# Patient Record
Sex: MALE | Race: WHITE | NOT HISPANIC OR LATINO
[De-identification: names, ages, dates, MRNs, and addresses within clinical notes are randomized per-mention and may not be internally consistent; named-entity substitution may affect disease eponyms.]

---

## 2022-04-13 PROBLEM — Z00.00 ENCOUNTER FOR PREVENTIVE HEALTH EXAMINATION: Status: ACTIVE | Noted: 2022-04-13

## 2022-04-18 ENCOUNTER — FORM ENCOUNTER (OUTPATIENT)
Age: 62
End: 2022-04-18

## 2022-05-04 ENCOUNTER — APPOINTMENT (OUTPATIENT)
Dept: ORTHOPEDIC SURGERY | Facility: CLINIC | Age: 62
End: 2022-05-04
Payer: COMMERCIAL

## 2022-05-04 VITALS — HEIGHT: 70 IN | BODY MASS INDEX: 28.63 KG/M2 | WEIGHT: 200 LBS

## 2022-05-04 DIAGNOSIS — Z78.9 OTHER SPECIFIED HEALTH STATUS: ICD-10-CM

## 2022-05-04 PROCEDURE — 72040 X-RAY EXAM NECK SPINE 2-3 VW: CPT

## 2022-05-04 PROCEDURE — 99213 OFFICE O/P EST LOW 20 MIN: CPT

## 2022-05-05 ENCOUNTER — OUTPATIENT (OUTPATIENT)
Dept: OUTPATIENT SERVICES | Facility: HOSPITAL | Age: 62
LOS: 1 days | Discharge: HOME | End: 2022-05-05
Payer: COMMERCIAL

## 2022-05-05 VITALS
HEIGHT: 69 IN | WEIGHT: 209.88 LBS | RESPIRATION RATE: 17 BRPM | DIASTOLIC BLOOD PRESSURE: 86 MMHG | TEMPERATURE: 98 F | HEART RATE: 72 BPM | OXYGEN SATURATION: 97 % | SYSTOLIC BLOOD PRESSURE: 129 MMHG

## 2022-05-05 DIAGNOSIS — M16.11 UNILATERAL PRIMARY OSTEOARTHRITIS, RIGHT HIP: ICD-10-CM

## 2022-05-05 DIAGNOSIS — Z01.818 ENCOUNTER FOR OTHER PREPROCEDURAL EXAMINATION: ICD-10-CM

## 2022-05-05 DIAGNOSIS — I10 ESSENTIAL (PRIMARY) HYPERTENSION: ICD-10-CM

## 2022-05-05 DIAGNOSIS — E11.9 TYPE 2 DIABETES MELLITUS WITHOUT COMPLICATIONS: ICD-10-CM

## 2022-05-05 LAB
A1C WITH ESTIMATED AVERAGE GLUCOSE RESULT: 7.3 % — HIGH (ref 4–5.6)
ALBUMIN SERPL ELPH-MCNC: 4.2 G/DL — SIGNIFICANT CHANGE UP (ref 3.3–5)
ALP SERPL-CCNC: 74 U/L — SIGNIFICANT CHANGE UP (ref 40–120)
ALT FLD-CCNC: 39 U/L — SIGNIFICANT CHANGE UP (ref 12–78)
ANION GAP SERPL CALC-SCNC: 5 MMOL/L — SIGNIFICANT CHANGE UP (ref 5–17)
APTT BLD: 35.6 SEC — HIGH (ref 27.5–35.5)
AST SERPL-CCNC: 17 U/L — SIGNIFICANT CHANGE UP (ref 15–37)
BASOPHILS # BLD AUTO: 0.06 K/UL — SIGNIFICANT CHANGE UP (ref 0–0.2)
BASOPHILS NFR BLD AUTO: 0.9 % — SIGNIFICANT CHANGE UP (ref 0–2)
BILIRUB SERPL-MCNC: 0.8 MG/DL — SIGNIFICANT CHANGE UP (ref 0.2–1.2)
BLD GP AB SCN SERPL QL: SIGNIFICANT CHANGE UP
BUN SERPL-MCNC: 16 MG/DL — SIGNIFICANT CHANGE UP (ref 7–23)
CALCIUM SERPL-MCNC: 10.2 MG/DL — HIGH (ref 8.5–10.1)
CHLORIDE SERPL-SCNC: 106 MMOL/L — SIGNIFICANT CHANGE UP (ref 96–108)
CO2 SERPL-SCNC: 28 MMOL/L — SIGNIFICANT CHANGE UP (ref 22–31)
CREAT SERPL-MCNC: 0.83 MG/DL — SIGNIFICANT CHANGE UP (ref 0.5–1.3)
EGFR: 98 ML/MIN/1.73M2 — SIGNIFICANT CHANGE UP
EOSINOPHIL # BLD AUTO: 0.11 K/UL — SIGNIFICANT CHANGE UP (ref 0–0.5)
EOSINOPHIL NFR BLD AUTO: 1.7 % — SIGNIFICANT CHANGE UP (ref 0–6)
ESTIMATED AVERAGE GLUCOSE: 163 MG/DL — HIGH (ref 68–114)
GLUCOSE SERPL-MCNC: 168 MG/DL — HIGH (ref 70–99)
HCT VFR BLD CALC: 48.6 % — SIGNIFICANT CHANGE UP (ref 39–50)
HGB BLD-MCNC: 16.6 G/DL — SIGNIFICANT CHANGE UP (ref 13–17)
IMM GRANULOCYTES NFR BLD AUTO: 0.3 % — SIGNIFICANT CHANGE UP (ref 0–1.5)
INR BLD: 1.18 RATIO — HIGH (ref 0.88–1.16)
LYMPHOCYTES # BLD AUTO: 2.1 K/UL — SIGNIFICANT CHANGE UP (ref 1–3.3)
LYMPHOCYTES # BLD AUTO: 32.8 % — SIGNIFICANT CHANGE UP (ref 13–44)
MCHC RBC-ENTMCNC: 29.2 PG — SIGNIFICANT CHANGE UP (ref 27–34)
MCHC RBC-ENTMCNC: 34.2 G/DL — SIGNIFICANT CHANGE UP (ref 32–36)
MCV RBC AUTO: 85.6 FL — SIGNIFICANT CHANGE UP (ref 80–100)
MONOCYTES # BLD AUTO: 0.49 K/UL — SIGNIFICANT CHANGE UP (ref 0–0.9)
MONOCYTES NFR BLD AUTO: 7.7 % — SIGNIFICANT CHANGE UP (ref 2–14)
MRSA PCR RESULT.: SIGNIFICANT CHANGE UP
NEUTROPHILS # BLD AUTO: 3.62 K/UL — SIGNIFICANT CHANGE UP (ref 1.8–7.4)
NEUTROPHILS NFR BLD AUTO: 56.6 % — SIGNIFICANT CHANGE UP (ref 43–77)
NRBC # BLD: 0 /100 WBCS — SIGNIFICANT CHANGE UP (ref 0–0)
PLATELET # BLD AUTO: 208 K/UL — SIGNIFICANT CHANGE UP (ref 150–400)
POTASSIUM SERPL-MCNC: 4.4 MMOL/L — SIGNIFICANT CHANGE UP (ref 3.5–5.3)
POTASSIUM SERPL-SCNC: 4.4 MMOL/L — SIGNIFICANT CHANGE UP (ref 3.5–5.3)
PROT SERPL-MCNC: 7.5 GM/DL — SIGNIFICANT CHANGE UP (ref 6–8.3)
PROTHROM AB SERPL-ACNC: 14.2 SEC — HIGH (ref 10.5–13.4)
RBC # BLD: 5.68 M/UL — SIGNIFICANT CHANGE UP (ref 4.2–5.8)
RBC # FLD: 12.2 % — SIGNIFICANT CHANGE UP (ref 10.3–14.5)
S AUREUS DNA NOSE QL NAA+PROBE: SIGNIFICANT CHANGE UP
SODIUM SERPL-SCNC: 139 MMOL/L — SIGNIFICANT CHANGE UP (ref 135–145)
WBC # BLD: 6.4 K/UL — SIGNIFICANT CHANGE UP (ref 3.8–10.5)
WBC # FLD AUTO: 6.4 K/UL — SIGNIFICANT CHANGE UP (ref 3.8–10.5)

## 2022-05-05 PROCEDURE — 93010 ELECTROCARDIOGRAM REPORT: CPT

## 2022-05-05 NOTE — PHYSICAL THERAPY INITIAL EVALUATION ADULT - PERTINENT HX OF CURRENT PROBLEM, REHAB EVAL
R hip OA c chronic pain and  limiting functional mobility. Pt is scheduled for R hip elective total joint replacement, posterior approach,  on   by  Dr. Gutierrez.

## 2022-05-05 NOTE — OCCUPATIONAL THERAPY INITIAL EVALUATION ADULT - ADDITIONAL COMMENTS
Pt lives with family (Who can assist post op) in a private house with 1 small step to enter with no rails (Pt can fit a rolling walker on). Once inside, the pt has 5 steps with a L rail to a platform and then another 5 steps with no rails to reach the main floor where the bedroom and bathroom is. The pts bathroom has a walk in shower stall, fixed shower head, comfort height toilet and no grab bars. The pt reports that a 3/1 commode can fit over the toilet at home. The pt ambulates with no device and owns a straight cane. The pt daily pain is a 3-4/10 at rest and a 7/10 with movement. The pt manages the pain with rest with Hydrocodone 1x a day. The pt goes to outpatient PT 1-2x a week, no recent falls and has R knee buckling every now and then. The pt wears glasses all the time, R handed, drives and has no hearing impairments.

## 2022-05-05 NOTE — OCCUPATIONAL THERAPY INITIAL EVALUATION ADULT - PERTINENT HX OF CURRENT PROBLEM, REHAB EVAL
R hip OA which impacts pts ability to perform functional tasks/transfers and mobility. Pt is scheduled for R hip OA which impacts pts ability to perform functional tasks/transfers and mobility.

## 2022-05-05 NOTE — PHYSICAL THERAPY INITIAL EVALUATION ADULT - ADDITIONAL COMMENTS
There is one small platform step, wide enough to fit a rolling walker, at the entry of the house. There 5 steps, c L rail up and followed by another 5 steps, w/o rail,  to negotiate at home.  Pt has a walk in shower c fixed shower head,  no grab bar, and comfort height toilet seat in BR. 3-1 commode will fit in BR. Average pain level at rest is 3-4/10. Pain increases to 7/10 c weight bearing, prolonged standing, ambulation, and stairs negotiation. Pt takes hydrocodone at night for pain. Pt has no experience of adverse effects with pain medication. Pt is on out-pt physical therapy, once a week, at present.  There is no h/o fall but R knee babar once a while. Pt wears glasses for reading and distance and no need for hearing aid. Pt is R handed and drives.

## 2022-05-05 NOTE — H&P PST ADULT - PROBLEM SELECTOR PLAN 1
Right total hip replacement  labs - cbc,pt/ptt,bmp,t&s,nose cx,ekg  M/C required  cardiac clearance  preop 3 day hibiclens instruction reviewed and given .instructed on if  nose cx positive use mupuricin 5 days and checklist given  take routine meds DOS with sips of water. avoid NSAID and OTC supplements. verbalized understanding  information on proper nutrition , increase protein and better food choices provided in packet   ensure clear held 2/2 DM  Anesthesiologist to review PST labs, EKG, required clearances and optimization for surgery.

## 2022-05-05 NOTE — PHYSICAL THERAPY INITIAL EVALUATION ADULT - GENERAL OBSERVATIONS, REHAB EVAL
Patient was seen seated  in chair in PT/OT preoperative assessment room with no apparent distress. Height:  5'9"     ; weight : 205    lbs.

## 2022-05-05 NOTE — PHYSICAL THERAPY INITIAL EVALUATION ADULT - ASR EQUIP NEEDS DISCH PT EVAL
Pt owns a straight cane and it's in good working condition. Pt will purchase axillary crutches for safe stairs negotiation./3:1 commode/rolling walker (5 inch wheels)

## 2022-05-05 NOTE — PHYSICAL THERAPY INITIAL EVALUATION ADULT - LIVES WITH, PROFILE
in a private house. Family will be available to assist pt in post -op care upon discharge home./children/spouse

## 2022-05-05 NOTE — H&P PST ADULT - HISTORY OF PRESENT ILLNESS
62M pmh htn, dm c/o right hip pain 2/2 unilateral primary osteoarthritis here for PsT for scheduled Right total hip replacement  This patient denies any fever, cough, sob, flu like symptoms or travel outside of the [place] in the past 30 days

## 2022-05-05 NOTE — H&P PST ADULT - PRO ANTICIPATED DISCH DISP
Relevant Problems No relevant active problems Anesthetic History No history of anesthetic complications Pertinent negatives: No PONV Review of Systems / Medical History Patient summary reviewed, nursing notes reviewed and pertinent labs reviewed Pulmonary Pertinent negatives: No COPD, asthma and smoker Neuro/Psych Within defined limits Cardiovascular Within defined limits Pertinent negatives: No hypertension, past MI and CAD 
 
  
GI/Hepatic/Renal 
Within defined limits Pertinent negatives: No PUD (h/o), liver disease and renal disease Endo/Other Within defined limits Diabetes Other Findings Comments: etoh rare Physical Exam 
 
Airway Mallampati: II 
TM Distance: 4 - 6 cm Neck ROM: decreased range of motion Mouth opening: Normal 
 
 Cardiovascular Dental 
 
 
  
Pulmonary Abdominal 
 
 
 
 Other Findings Anesthetic Plan ASA: 2 Anesthesia type: general 
 
 
 
 
Induction: Intravenous Anesthetic plan and risks discussed with: Patient home

## 2022-05-05 NOTE — H&P PST ADULT - NSICDXPASTMEDICALHX_GEN_ALL_CORE_FT
PAST MEDICAL HISTORY:  DM (diabetes mellitus)     H/O hand surgery right    HTN (hypertension)     S/P cervical spinal fusion     S/P lumbar fusion

## 2022-05-06 LAB — VIT D25+D1,25 OH+D1,25 PNL SERPL-MCNC: 66.3 PG/ML — SIGNIFICANT CHANGE UP (ref 19.9–79.3)

## 2022-05-08 PROBLEM — Z78.9 NON-SMOKER: Status: ACTIVE | Noted: 2022-05-04

## 2022-05-08 NOTE — DISCUSSION/SUMMARY
[Medication Risks Reviewed] : Medication risks reviewed [de-identified] : Discussed proper body mechanics and modified physical activity to avoid aggravation of symptoms. Patient will continue with formal physical therapy and at home exercises/stretches as best tolerated. Patient will continue treatment with hydrocodone as prescribed. Advised patient of habit forming potential with analgesic drug use. Reviewed and discussed results of cervical spine x-ray. Patient will follow up in 6 months.

## 2022-05-08 NOTE — HISTORY OF PRESENT ILLNESS
[de-identified] : Patient states the severity of his symptoms has reduced since his previous visit. No new complaints. Treatment with formal physical therapy has provided relief. Patient has continued with at home exercises/stretches as best tolerated. Patient states he still has episodic pain radiating into lower extremities b/l. No changes in upper or lower extremity strength b/l. No numbness/tingling sensation to upper and lower extremities b/l. Treatment with hydrocodone as prescribed has provided relief. Symptoms are aggravated with heavy lifting. ROM of upper and lower extremities have improved. General medical health is good.

## 2022-05-22 ENCOUNTER — TRANSCRIPTION ENCOUNTER (OUTPATIENT)
Age: 62
End: 2022-05-22

## 2022-05-23 ENCOUNTER — APPOINTMENT (OUTPATIENT)
Dept: ORTHOPEDIC SURGERY | Facility: HOSPITAL | Age: 62
End: 2022-05-23
Payer: COMMERCIAL

## 2022-05-23 ENCOUNTER — OUTPATIENT (OUTPATIENT)
Dept: OUTPATIENT SERVICES | Facility: HOSPITAL | Age: 62
LOS: 1 days | Discharge: HOME | End: 2022-05-23

## 2022-05-23 ENCOUNTER — TRANSCRIPTION ENCOUNTER (OUTPATIENT)
Age: 62
End: 2022-05-23

## 2022-05-23 DIAGNOSIS — E78.5 HYPERLIPIDEMIA, UNSPECIFIED: ICD-10-CM

## 2022-05-23 DIAGNOSIS — M16.11 UNILATERAL PRIMARY OSTEOARTHRITIS, RIGHT HIP: ICD-10-CM

## 2022-05-23 DIAGNOSIS — Z79.84 LONG TERM (CURRENT) USE OF ORAL HYPOGLYCEMIC DRUGS: ICD-10-CM

## 2022-05-23 DIAGNOSIS — M16.12 UNILATERAL PRIMARY OSTEOARTHRITIS, LEFT HIP: ICD-10-CM

## 2022-05-23 DIAGNOSIS — E11.9 TYPE 2 DIABETES MELLITUS WITHOUT COMPLICATIONS: ICD-10-CM

## 2022-05-23 DIAGNOSIS — I10 ESSENTIAL (PRIMARY) HYPERTENSION: ICD-10-CM

## 2022-05-23 DIAGNOSIS — M17.11 UNILATERAL PRIMARY OSTEOARTHRITIS, RIGHT KNEE: ICD-10-CM

## 2022-05-23 PROCEDURE — 27130 TOTAL HIP ARTHROPLASTY: CPT | Mod: RT

## 2022-05-23 PROCEDURE — 20985 CPTR-ASST DIR MS PX: CPT

## 2022-05-23 PROCEDURE — 20610 DRAIN/INJ JOINT/BURSA W/O US: CPT | Mod: RT

## 2022-05-23 PROCEDURE — 20610 DRAIN/INJ JOINT/BURSA W/O US: CPT | Mod: RT,59

## 2022-05-23 PROCEDURE — 27130 TOTAL HIP ARTHROPLASTY: CPT | Mod: AS,RT

## 2022-05-23 PROCEDURE — 73501 X-RAY EXAM HIP UNI 1 VIEW: CPT | Mod: 26,RT

## 2022-05-23 PROCEDURE — 20985 CPTR-ASST DIR MS PX: CPT | Mod: AS

## 2022-05-23 PROCEDURE — 73501 X-RAY EXAM HIP UNI 1 VIEW: CPT | Mod: 26,AS,RT

## 2022-05-24 ENCOUNTER — TRANSCRIPTION ENCOUNTER (OUTPATIENT)
Age: 62
End: 2022-05-24

## 2022-06-07 ENCOUNTER — APPOINTMENT (OUTPATIENT)
Dept: ORTHOPEDIC SURGERY | Facility: CLINIC | Age: 62
End: 2022-06-07
Payer: COMMERCIAL

## 2022-06-07 VITALS — BODY MASS INDEX: 29.62 KG/M2 | WEIGHT: 200 LBS | HEIGHT: 69 IN

## 2022-06-07 DIAGNOSIS — Z96.641 PRESENCE OF RIGHT ARTIFICIAL HIP JOINT: ICD-10-CM

## 2022-06-07 PROCEDURE — 73502 X-RAY EXAM HIP UNI 2-3 VIEWS: CPT | Mod: RT

## 2022-06-07 PROCEDURE — 99024 POSTOP FOLLOW-UP VISIT: CPT

## 2022-06-07 NOTE — ASSESSMENT
[FreeTextEntry1] : S/P RT MIGUEL 5/23/22. DOING WELL. NO F/C/S. WE DISCUSSED THE IMPORTANCE OF ELEVATION TO REDUCE SWELLING, XRAYS REVIEWED WITH COMPONENTS WELL FIXED. NO SIGNS OF INFECTION. PROPER ELEVATION TECHNIQUES DISCUSSED. ABX AND PRECAUTIONS REVIEWED. PT RX. QUESTIONS ANSWERED.

## 2022-06-07 NOTE — IMAGING
[Right] : right hip with pelvis [All Views] : anteroposterior, lateral [Components well fixed, in good position] : Components well fixed, in good position [de-identified] : Constitutional: The patient appears well developed, well nourished. Examination of patients ability to communicate functionally was normal. \par \par Skin: Skin of the head and face is normal without rashes, lesions or ulcers. \par \par Cardiovascular: Peripheral Vascular System is normal. \par \par Neurologic: Alert and oriented to time, place and person. No evidence of mood disorder, calm affect. \par \par Right Hip/Thigh: Inspection of the hip/thigh is as follows: Inspection shows no swelling and no ecchymosis. Palpation\par of the hip/thigh is as follows: groin tenderness. Range of motion of the hip is as follows: limited internal\par rotation, limited external rotation, pain with flexion and internal rotation and pain with flexion and external\par rotation. Strength testing of the hip/thigh is as follows: Hip flexion strength is 5/5, Hip extension strength is 5/5, Hip\par abductionstrength is 5/5 and Hip adductionstrength is 5/5. Special tests of the hip/thigh is as follows: pain in groin\par with internal rotation and pain in groin with external rotation. Neurological testing of the hip/thigh is as\par follows: motor exam 5/5 throughout and light touch intact throughout. \par \par Left Hip/Thigh: Inspection of the hip/thigh is as follows: Inspection shows no swelling and no ecchymosis. Palpation\par of the hip/thigh is as follows: groin tenderness. Range of motion of the hip is as follows: limited internal\par rotation, limited external rotation, pain with flexion and internal rotation and pain with flexion and external\par rotation. Strength testing of the hip/thigh is as follows: Hip flexion strength is 5/5, Hip extension strength is 5/5, Hip\par abductionstrength is 5/5 and Hip adductionstrength is 5/5. Special tests of the hip/thigh is as follows: pain in groin\par with internal rotation and pain in groin with external rotation. Neurological testing of the hip/thigh is as\par follows: motor exam 5/5 throughout, light touch intact throughout and no focal motor deficits. \par \par Right Knee: Inspection of the knee is as follows: no effusion, erythema, ecchymosis, scars or deformities. Palpation\par of the knee is as follows: medial joint line tenderness, lateral joint line tenderness and crepitus about the\par patella. Knee Range of Motion is as follows: full flexion and extension without pain (0-140). Strength examination of the\par knee is as follows: Quadriceps strength is 5/5 Hamstring strength is 5/5 Ligament Stability and Special\par Test ligamentously stable. \par \par Left Knee: Inspection of the knee is as follows: no effusion, erythema, ecchymosis, scars or deformities. Palpation of\par the knee is as follows: medial joint line tenderness, lateral joint line tenderness and crepitus about the\par patella. Knee Range of Motion is as follows: full flexion and extension without pain (0-140). Strength examination of the\par knee is as follows: Quadriceps strength is 5/5 Hamstring strength is 5/5 Ligament Stability and Special\par Test ligamentously stable. Neurological examination of the knee is as follows: light touch is intact throughout.

## 2022-06-07 NOTE — REASON FOR VISIT
[FreeTextEntry2] : pt is here for follow up of right hip. pt states pain level is better than last visit.

## 2022-07-18 PROBLEM — Z00.00 ENCOUNTER FOR PREVENTIVE HEALTH EXAMINATION: Status: ACTIVE | Noted: 2022-07-18

## 2022-08-31 ENCOUNTER — APPOINTMENT (OUTPATIENT)
Dept: ORTHOPEDIC SURGERY | Facility: CLINIC | Age: 62
End: 2022-08-31

## 2022-08-31 VITALS — BODY MASS INDEX: 30.36 KG/M2 | WEIGHT: 205 LBS | HEIGHT: 69 IN

## 2022-08-31 DIAGNOSIS — E11.9 TYPE 2 DIABETES MELLITUS W/OUT COMPLICATIONS: ICD-10-CM

## 2022-08-31 PROCEDURE — 99213 OFFICE O/P EST LOW 20 MIN: CPT

## 2022-09-01 ENCOUNTER — APPOINTMENT (OUTPATIENT)
Dept: ORTHOPEDIC SURGERY | Facility: CLINIC | Age: 62
End: 2022-09-01

## 2022-09-01 PROCEDURE — 99213 OFFICE O/P EST LOW 20 MIN: CPT

## 2022-09-01 PROCEDURE — 73503 X-RAY EXAM HIP UNI 4/> VIEWS: CPT | Mod: RT

## 2022-11-17 ENCOUNTER — APPOINTMENT (OUTPATIENT)
Dept: ORTHOPEDIC SURGERY | Facility: CLINIC | Age: 62
End: 2022-11-17

## 2022-11-17 VITALS — BODY MASS INDEX: 30.36 KG/M2 | HEIGHT: 69 IN | WEIGHT: 205 LBS

## 2022-11-17 DIAGNOSIS — Z98.1 ARTHRODESIS STATUS: ICD-10-CM

## 2022-11-17 PROCEDURE — 99213 OFFICE O/P EST LOW 20 MIN: CPT

## 2022-11-17 PROCEDURE — 73502 X-RAY EXAM HIP UNI 2-3 VIEWS: CPT

## 2022-11-17 NOTE — HISTORY OF PRESENT ILLNESS
[Lower back] : lower back [Right Leg] : right leg [4] : 4 [Dull/Aching] : dull/aching [Localized] : localized [Intermittent] : intermittent [Household chores] : household chores [Physical therapy] : physical therapy [Walking] : walking [6] : 6 [de-identified] : pt is here today for a  right hip daisha  05/23/2022 follow up  done by Dr Gutierrez doing well\par \par stiffness, pt is feeling pain in the groin  [] : no [FreeTextEntry1] : hip [FreeTextEntry6] : stiffness [de-identified] : sx,physical therapy

## 2022-11-17 NOTE — ASSESSMENT
[FreeTextEntry1] : S/P RT MIGUEL 5/23/22. DOING WELL. NO F/C/S. C/O SOME HIP PAIN. XRAYS REVIEWED WITH COMPONENTS WELL FIXED. NO SIGNS OF INFECTION. ABX AND PRECAUTIONS REVIEWED. QUESTIONS ANSWERED.\par \par WILL ORDER ESR/CRP.\par WILL ORDER RT HIP MRI TO EVAL FOR PSOAS TENDONITIS AND EFFUSION.

## 2022-11-17 NOTE — PHYSICAL EXAM
[Right] : right hip [] : groin pain with resisted straight leg raise [2+] : posterior tibialis pulse: 2+

## 2022-11-30 ENCOUNTER — APPOINTMENT (OUTPATIENT)
Dept: ORTHOPEDIC SURGERY | Facility: CLINIC | Age: 62
End: 2022-11-30

## 2022-11-30 VITALS — WEIGHT: 205 LBS | BODY MASS INDEX: 30.36 KG/M2 | HEIGHT: 69 IN

## 2022-11-30 DIAGNOSIS — Z78.9 OTHER SPECIFIED HEALTH STATUS: ICD-10-CM

## 2022-11-30 PROCEDURE — 99214 OFFICE O/P EST MOD 30 MIN: CPT

## 2022-11-30 RX ORDER — MELOXICAM 15 MG/1
15 TABLET ORAL
Qty: 30 | Refills: 1 | Status: ACTIVE | COMMUNITY
Start: 2022-11-30 | End: 1900-01-01

## 2022-12-04 PROBLEM — Z78.9 NON-SMOKER: Status: ACTIVE | Noted: 2022-11-30

## 2022-12-04 NOTE — HISTORY OF PRESENT ILLNESS
[de-identified] : 11/30/2022 - 61 y/o male presenting for a follow up of lumbar. Back pain is worse since his last visit. Followed up with outside pm in the city, treated with hydrocodone and Meloxicam to manage his pain. Difficulty with rom. Has continued treatment with physical therapy and home exercise rehab. Back pain is worse with standing, walking, and physical activity. Continues to recover from Rt MIGUEL (05/2022) with Dr. Gutierrez. \par \par 08/31/2022 - 61 y/o male presents for an eval of neck & back. C/o low back and neck pain. He reports RT hip replacement in May, which has exacerbated his back pain. C/o Rt foot/ RT knee pain. Reports difficulty with ROM, walking, and lifting. States his groin pain has subsided since hip replacement. Reports leg discrepancy, RT leg is longer. He has been stretching daily, as well as using hot compresses as needed. Reports problems with flexibility, despite involvement in PT / daily exercises and stretches. \par \par

## 2022-12-04 NOTE — DISCUSSION/SUMMARY
[de-identified] : Discussed continued conservative treatments in the form of current medication regimen, core strengthening exercises, and continuation of PT. Patient was provided with a referral for lumbar physical therapy to work on stretching, strengthening, gait training, and range of motion. Advised patient to use Insole  for leg discrepancy. Will follow up in 3 months.\par \ibeth RUTS Acting as a Scribe for Dr. Miguel\Jie Fairbanks, attest that this documentation has been prepared under the direction and in the presence of Provider Delfin Miguel MD.

## 2022-12-04 NOTE — PHYSICAL EXAM
[NL (90)] : forward flexion 90 degrees [NL (30)] : right lateral rotation 30 degrees [NL (45)] : extension 45 degrees [NL (40)] : right lateral bending 40 degrees [Flexion] : flexion [Extension] : extension [5___] : right extensor hallicus longus 5[unfilled]/5 [Normal Coordination] : normal coordination [Normal DTR UE/LE] : normal DTR UE/LE  [Normal Sensation] : normal sensation [Normal Mood and Affect] : normal mood and affect [Orientated] : orientated [Able to Communicate] : able to communicate [Normal Skin] : normal skin [No Rash] : no rash [No Ulcers] : no ulcers [No Lesions] : no lesions [No obvious lymphadenopathy in areas examined] : no obvious lymphadenopathy in areas examined [Well Developed] : well developed [Peripheral vascular exam is grossly normal] : peripheral vascular exam is grossly normal [No Respiratory Distress] : no respiratory distress [de-identified] : Constitutional:\par - General Appearance:\par Unremarkable\par Body Habitus\par Well Developed\par Well Nourished\par Body Habitus\par No Deformities\par Well Groomed\par Ability To communicate:\par Normal\par Neurologic:\par Global sensation is intact to upper and lower extremities. See examination of Neck and/or Spine\par for exceptions.\par Orientation to Time, Place and Person is: Normal\par Mood And Affect is Normal\par Skin:\par - Head/Face, Right Upper/Lower Extremity, Left Upper/Lower Extremity: Normal\par See Examination of Neck and/or Spine for exceptions\par Cardiovascular:\par Peripheral Cardiovascular System is Normal\par Palpation of Lymph Nodes:\par Normal Palpation of lymph nodes in: Axilla, Cervical, Inguinal\par Abnormal Palpation of lymph nodes in: None  [] : non-antalgic

## 2022-12-04 NOTE — DATA REVIEWED
[FreeTextEntry1] : I stop paperwork reviewed\par Adult reconstruction orthopedic notes reviewed\par PT progress notes reviewed

## 2022-12-05 ENCOUNTER — APPOINTMENT (OUTPATIENT)
Dept: MRI IMAGING | Facility: CLINIC | Age: 62
End: 2022-12-05

## 2022-12-06 RX ORDER — AMLODIPINE BESYLATE 2.5 MG/1
1 TABLET ORAL
Qty: 0 | Refills: 0 | DISCHARGE

## 2022-12-06 RX ORDER — HYDROCODONE BITARTRATE 50 MG/1
0 CAPSULE, EXTENDED RELEASE ORAL
Qty: 0 | Refills: 0 | DISCHARGE

## 2022-12-06 RX ORDER — METFORMIN HYDROCHLORIDE 850 MG/1
2 TABLET ORAL
Qty: 0 | Refills: 0 | DISCHARGE

## 2023-01-03 ENCOUNTER — APPOINTMENT (OUTPATIENT)
Dept: ORTHOPEDIC SURGERY | Facility: CLINIC | Age: 63
End: 2023-01-03
Payer: COMMERCIAL

## 2023-01-03 VITALS — BODY MASS INDEX: 30.36 KG/M2 | HEIGHT: 69 IN | WEIGHT: 205 LBS

## 2023-01-03 PROCEDURE — 99214 OFFICE O/P EST MOD 30 MIN: CPT

## 2023-01-03 NOTE — PHYSICAL EXAM
[Right] : right hip [2+] : posterior tibialis pulse: 2+ [] : no pain with flexion and external rotation

## 2023-01-03 NOTE — HISTORY OF PRESENT ILLNESS
[Lower back] : lower back [Right Leg] : right leg [7] : 7 [4] : 4 [Dull/Aching] : dull/aching [Localized] : localized [Intermittent] : intermittent [Household chores] : household chores [Leisure] : leisure [Sleep] : sleep [Ice] : ice [Physical therapy] : physical therapy [Walking] : walking [Lying in bed] : lying in bed [de-identified] : pt is here today for a  right hip daisha  05/23/2022 follow up  done by Dr Gutierrez doing well\par \par stiffness, pt is feeling pain in the groin \par \par 1/3/23 Pt is here for follow up of RT hip. Pt states he has stiffness, soreness and his ROM is limited. Pt is attending PT 1x weekly and doing HEp with not much relief.  [] : Post Surgical Visit: no [FreeTextEntry1] : hip [FreeTextEntry6] : stiffness [de-identified] : twisting leg, turning in bed [de-identified] : sx,physical therapy

## 2023-01-03 NOTE — ASSESSMENT
[FreeTextEntry1] : S/P RT MIGUEL 5/23/22. DOING WELL. NO F/C/S. C/O CONTINUED HIP PAIN. XRAYS REVIEWED WITH COMPONENTS WELL FIXED. NO SIGNS OF INFECTION. ABX AND PRECAUTIONS REVIEWED. QUESTIONS ANSWERED.\par \par ESR/CRP WERE NORMAL. WAS UNABLE TO OBTAIN MRI. HAS BEEN SEEING BACK SPECIALIST FOR LUMBAR ISSUES. WILL FOLLOW UP IN 3 MONTHS.

## 2023-03-29 ENCOUNTER — APPOINTMENT (OUTPATIENT)
Dept: ORTHOPEDIC SURGERY | Facility: CLINIC | Age: 63
End: 2023-03-29
Payer: COMMERCIAL

## 2023-03-29 VITALS — HEIGHT: 69 IN | BODY MASS INDEX: 30.36 KG/M2 | WEIGHT: 205 LBS

## 2023-03-29 PROBLEM — Z98.1 ARTHRODESIS STATUS: Chronic | Status: ACTIVE | Noted: 2022-05-05

## 2023-03-29 PROBLEM — E11.9 TYPE 2 DIABETES MELLITUS WITHOUT COMPLICATIONS: Chronic | Status: ACTIVE | Noted: 2022-05-05

## 2023-03-29 PROBLEM — Z98.890 OTHER SPECIFIED POSTPROCEDURAL STATES: Chronic | Status: ACTIVE | Noted: 2022-05-05

## 2023-03-29 PROBLEM — I10 ESSENTIAL (PRIMARY) HYPERTENSION: Chronic | Status: ACTIVE | Noted: 2022-05-05

## 2023-03-29 PROCEDURE — 99214 OFFICE O/P EST MOD 30 MIN: CPT

## 2023-03-30 NOTE — DISCUSSION/SUMMARY
[de-identified] : Medication‘s were renewed today. He will continue to follow up with PCP for regular blood work. I have encouraged him to find a pain management physician and offered him a visit with our staff. He will look for someone local to the [facility] or consider traveling to [place] to see pain management as necessary going forward. Follow up in three months.\par \par Prior to appointment and during encounter with patient extensive medical records were reviewed including but not limited to, hospital records, outpatient records, imaging results, and lab data.During this appointment the patient was examined, diagnoses were discussed and explained in a face to face manner. In addition extensive time was spent reviewing aforementioned diagnostic studies. Counseling including abnormal image results, differential diagnoses, treatment options, risk and benefits, lifestyle changes, current condition, and current medications was performed. Patient's comments, questions, and concerns were addressed and patient verbalized understanding. Based on this patient's presentation at our office, which is an orthopedic spine surgeon's office, this patient inherently / intrinsically has a risk, however minute, of developing issues such as Cauda equina syndrome, bowel and bladder changes, or progression of motor or neurological deficits such as paralysis which may be permanent.

## 2023-03-30 NOTE — DATA REVIEWED
[FreeTextEntry1] : I stop paperwork reviewed\par PT progress notes reviewed\par Adult reconstruction orthopedic progress notes reviewed

## 2023-03-30 NOTE — HISTORY OF PRESENT ILLNESS
[de-identified] : 3/29/23- Patient returns to the office for routine follow up. Last seen approximately three months ago. Continues to have routine low back pain and right hip pains. He is seeing Dr. Gutierrez for the right hip. He reports he’s approximately nine months status post hip replacement. Continues with physical therapy and Pilates exercises. He is using one tablet of hydrocodone in the evening for pain control, and anti-inflammatory as necessary. Denies numbness, tingling or weakness in the distal lower extremities. Complaints are predominantly stiffness in the lower back and the neck in addition to right anterior groin pain\par \par 11/30/2022 - 61 y/o male presenting for a follow up of lumbar. Back pain is worse since his last visit. Followed up with outside pm in the city, treated with hydrocodone and Meloxicam to manage his pain. Difficulty with rom. Has continued treatment with physical therapy and home exercise rehab. Back pain is worse with standing, walking, and physical activity. Continues to recover from Rt MIGUEL (05/2022) with Dr. Gutierrez. \par \par 08/31/2022 - 61 y/o male presents for an eval of neck & back. C/o low back and neck pain. He reports RT hip replacement in May, which has exacerbated his back pain. C/o Rt foot/ RT knee pain. Reports difficulty with ROM, walking, and lifting. States his groin pain has subsided since hip replacement. Reports leg discrepancy, RT leg is longer. He has been stretching daily, as well as using hot compresses as needed. Reports problems with flexibility, despite involvement in PT / daily exercises and stretches. \par

## 2023-03-30 NOTE — PHYSICAL EXAM
[Normal Coordination] : normal coordination [Normal DTR UE/LE] : normal DTR UE/LE  [Normal Sensation] : normal sensation [Normal Mood and Affect] : normal mood and affect [Orientated] : orientated [Able to Communicate] : able to communicate [Normal Skin] : normal skin [No Rash] : no rash [No Ulcers] : no ulcers [No Lesions] : no lesions [No obvious lymphadenopathy in areas examined] : no obvious lymphadenopathy in areas examined [Well Developed] : well developed [Peripheral vascular exam is grossly normal] : peripheral vascular exam is grossly normal [No Respiratory Distress] : no respiratory distress [5___] : right extensor hallicus longus 5[unfilled]/5 [] : clonus not sustained at ankle [de-identified] : ambulatory, slow steady gait.  [FreeTextEntry9] : Restricted ROM

## 2023-04-18 ENCOUNTER — APPOINTMENT (OUTPATIENT)
Dept: ORTHOPEDIC SURGERY | Facility: CLINIC | Age: 63
End: 2023-04-18
Payer: COMMERCIAL

## 2023-04-18 VITALS — WEIGHT: 200 LBS | BODY MASS INDEX: 29.62 KG/M2 | HEIGHT: 69 IN

## 2023-04-18 PROCEDURE — 73503 X-RAY EXAM HIP UNI 4/> VIEWS: CPT | Mod: RT

## 2023-04-18 PROCEDURE — 99213 OFFICE O/P EST LOW 20 MIN: CPT

## 2023-04-18 NOTE — ASSESSMENT
[FreeTextEntry1] : S/P RT MIGUEL 5/23/22. DOING WELL. NO F/C/S. C/O CONTINUED HIP PAIN. XRAYS REVIEWED WITH COMPONENTS WELL FIXED. NO SIGNS OF INFECTION. ABX AND PRECAUTIONS REVIEWED. QUESTIONS ANSWERED.\par \par ESR/CRP WERE NORMAL. WAS UNABLE TO OBTAIN MRI. HAS BEEN SEEING BACK SPECIALIST FOR LUMBAR ISSUES. \par PAIN IN GROIN PERSITS WITH LIKELY PSOAS TENDONITIS\par UNABLE TO GET MRI UNLESS SEDATED, WILL GET US EVAL AND GUIDED INJECTION PSOAS TENDON\par WILL PAY ATTENTION TO [facility] AND WATCH SUGARS

## 2023-04-18 NOTE — HISTORY OF PRESENT ILLNESS
[Lower back] : lower back [Right Leg] : right leg [Gradual] : gradual [7] : 7 [4] : 4 [Dull/Aching] : dull/aching [Localized] : localized [Intermittent] : intermittent [Household chores] : household chores [Leisure] : leisure [Sleep] : sleep [Ice] : ice [Physical therapy] : physical therapy [Walking] : walking [Lying in bed] : lying in bed [de-identified] : pt is here today for a  right hip daisha  05/23/2022 follow up  done by Dr Gutierrez doing well\par \par stiffness, pt is feeling pain in the groin \par \par 1/3/23 Pt is here for follow up of RT hip. Pt states he has stiffness, soreness and his ROM is limited. Pt is attending PT 1x weekly and doing HEp with not much relief. \par \par 04/18/2023 follow up s/p right daisha having groin stiffness and pain in groin, hard to lift leg [] : Post Surgical Visit: no [FreeTextEntry1] : hip [FreeTextEntry6] : stiffness [de-identified] : twisting leg, turning in bed [de-identified] : sx,physical therapy

## 2023-05-09 ENCOUNTER — APPOINTMENT (OUTPATIENT)
Dept: ORTHOPEDIC SURGERY | Facility: CLINIC | Age: 63
End: 2023-05-09
Payer: COMMERCIAL

## 2023-05-09 VITALS — HEIGHT: 69 IN | BODY MASS INDEX: 29.62 KG/M2 | WEIGHT: 200 LBS

## 2023-05-09 PROCEDURE — 99213 OFFICE O/P EST LOW 20 MIN: CPT

## 2023-05-09 NOTE — ASSESSMENT
[FreeTextEntry1] : S/P RT MIGUEL 5/23/22. DOING WELL. NO F/C/S. C/O CONTINUED HIP PAIN. XRAYS REVIEWED WITH COMPONENTS WELL FIXED. NO SIGNS OF INFECTION. ABX AND PRECAUTIONS REVIEWED. QUESTIONS ANSWERED.\par \par ESR/CRP WERE NORMAL. WAS UNABLE TO OBTAIN MRI. HAS BEEN SEEING BACK SPECIALIST FOR LUMBAR ISSUES. \par PAIN IN GROIN PERSITS WITH LIKELY PSOAS TENDONITIS\par \par PSOAS TENDON INJECTION WITH US EVAL CONFIRMED BURSITIS AND WITH SOME RELIEF

## 2023-05-09 NOTE — HISTORY OF PRESENT ILLNESS
[Lower back] : lower back [Right Leg] : right leg [Gradual] : gradual [7] : 7 [4] : 4 [Dull/Aching] : dull/aching [Localized] : localized [Intermittent] : intermittent [Household chores] : household chores [Leisure] : leisure [Sleep] : sleep [Ice] : ice [Physical therapy] : physical therapy [Walking] : walking [Lying in bed] : lying in bed [de-identified] : 5/9/23 Pt is here for right hip follow up s/p right psoas injection with some relief.\par  [] : Post Surgical Visit: no [FreeTextEntry1] : hip [FreeTextEntry6] : stiffness [de-identified] : twisting leg, turning in bed [de-identified] : sx,physical therapy

## 2023-07-12 ENCOUNTER — APPOINTMENT (OUTPATIENT)
Dept: ORTHOPEDIC SURGERY | Facility: CLINIC | Age: 63
End: 2023-07-12
Payer: COMMERCIAL

## 2023-07-12 VITALS — HEIGHT: 69 IN | WEIGHT: 200 LBS | BODY MASS INDEX: 29.62 KG/M2

## 2023-07-12 PROCEDURE — 99214 OFFICE O/P EST MOD 30 MIN: CPT

## 2023-08-20 NOTE — HISTORY OF PRESENT ILLNESS
[de-identified] : 07/12/2023 - Patient presenting for a follow up neck and back. Chief complaint of axial neck and back stiffness.  No pain, numbness/tingling, or changes in strength in the lower or upper extremities b/l. C/o cracking in the cervical. Intermittent stiff gait. Walking is not limited. Not in physical therapy at this time, remains active at home through ambulation and aquatic exercises. Overall symptoms are stable. Medication regiment with occasional hydrocodone and Meloxicam is helpful. \par \par 3/29/23- Patient returns to the office for routine follow up. Last seen approximately three months ago. Continues to have routine low back pain and right hip pains. He is seeing Dr. Gutierrez for the right hip. He reports he?s approximately nine months status post hip replacement. Continues with physical therapy and Pilates exercises. He is using one tablet of hydrocodone in the evening for pain control, and anti-inflammatory as necessary. Denies numbness, tingling or weakness in the distal lower extremities. Complaints are predominantly stiffness in the lower back and the neck in addition to right anterior groin pain\par \par 11/30/2022 - 61 y/o male presenting for a follow up of lumbar. Back pain is worse since his last visit. Followed up with outside pm in the city, treated with hydrocodone and Meloxicam to manage his pain. Difficulty with rom. Has continued treatment with physical therapy and home exercise rehab. Back pain is worse with standing, walking, and physical activity. Continues to recover from Rt MIGUEL (05/2022) with Dr. Gutierrez. \par \par 08/31/2022 - 61 y/o male presents for an eval of neck & back. C/o low back and neck pain. He reports RT hip replacement in May, which has exacerbated his back pain. C/o Rt foot/ RT knee pain. Reports difficulty with ROM, walking, and lifting. States his groin pain has subsided since hip replacement. Reports leg discrepancy, RT leg is longer. He has been stretching daily, as well as using hot compresses as needed. Reports problems with flexibility, despite involvement in PT / daily exercises and stretches. \par

## 2023-08-20 NOTE — DISCUSSION/SUMMARY
[de-identified] : Discussed continued medical mgmt with hydrocodone and Meloxicam and exercise based rehabilitation. RX provided for hydrocodone, medication management and risks reviewed with analgesic drug use. Patient advised to walk short distances more frequently to continue building strength and continuation of at home aquatic exercises. Possible resumed physical therapy in the fall for neck and back. \par \par Prior to appointment and during encounter with patient extensive medical records were reviewed including but not limited to, hospital records, outpatient records, imaging results, and lab data.During this appointment the patient was examined, diagnoses were discussed and explained in a face to face manner. In addition extensive time was spent reviewing aforementioned diagnostic studies. Counseling including abnormal image results, differential diagnoses, treatment options, risk and benefits, lifestyle changes, current condition, and current medications was performed. Patient's comments, questions, and concerns were addressed and patient verbalized understanding. Based on this patient's presentation at our office, which is an orthopedic spine surgeon's office, this patient inherently / intrinsically has a risk, however minute, of developing issues such as Cauda equina syndrome, bowel and bladder changes, or progression of motor or neurological deficits such as paralysis which may be permanent. \par \par JIE MCMAHAN Acting as a Scribe for Dr. Betty WHITT, Jie Mcmahan, attest that this documentation has been prepared under the direction and in the presence of Provider Delfin Miguel MD.

## 2023-08-20 NOTE — PHYSICAL EXAM
[Normal Coordination] : normal coordination [Normal DTR UE/LE] : normal DTR UE/LE  [Normal Sensation] : normal sensation [Normal Mood and Affect] : normal mood and affect [Orientated] : orientated [Able to Communicate] : able to communicate [Normal Skin] : normal skin [No Rash] : no rash [No Ulcers] : no ulcers [No Lesions] : no lesions [No obvious lymphadenopathy in areas examined] : no obvious lymphadenopathy in areas examined [Well Developed] : well developed [Peripheral vascular exam is grossly normal] : peripheral vascular exam is grossly normal [No Respiratory Distress] : no respiratory distress [5___] : right extensor hallicus longus 5[unfilled]/5 [] : clonus not sustained at ankle [de-identified] : ambulatory, slow steady gait.  [FreeTextEntry9] : Restricted ROM

## 2023-08-20 NOTE — DATA REVIEWED
[FreeTextEntry1] : I stop paperwork reviewed PT progress notes reviewed Adult reconstruction orthopedic progress notes reviewed

## 2023-08-29 ENCOUNTER — APPOINTMENT (OUTPATIENT)
Dept: ORTHOPEDIC SURGERY | Facility: CLINIC | Age: 63
End: 2023-08-29
Payer: COMMERCIAL

## 2023-08-29 VITALS — BODY MASS INDEX: 29.62 KG/M2 | HEIGHT: 69 IN | WEIGHT: 200 LBS

## 2023-08-29 DIAGNOSIS — Z96.641 PRESENCE OF RIGHT ARTIFICIAL HIP JOINT: ICD-10-CM

## 2023-08-29 DIAGNOSIS — M76.11 PSOAS TENDINITIS, RIGHT HIP: ICD-10-CM

## 2023-08-29 PROCEDURE — 99214 OFFICE O/P EST MOD 30 MIN: CPT

## 2023-08-29 NOTE — ASSESSMENT
[FreeTextEntry1] : S/P RT MIGUEL 5/23/22. DOING WELL. NO F/C/S. C/O CONTINUED HIP PAIN. XRAYS REVIEWED WITH COMPONENTS WELL FIXED. NO SIGNS OF INFECTION. ABX AND PRECAUTIONS REVIEWED. QUESTIONS ANSWERED.  ESR/CRP WERE NORMAL. WAS UNABLE TO OBTAIN MRI. HAS BEEN SEEING BACK SPECIALIST FOR LUMBAR ISSUES.  HAD PSOAS INJECTION 4/25/23 WITH SOME RELIEF.  I BELIEVE HE STILL HAS SOME PAIN FROM HIS PSOAS TENDON.  WE CAN REPEAT THE U/S EVAL AND INJ.    HE IS ALSO COMPLAINING OF MEDIAL GROIN PAIN WITH RADIATION DOWN ADDUCTOR ROLAND.  THIS IS LIKELY RELATED TO RADIC.  HE IS ALSO C/O PAIN ANT/LAT THIGH.  THIS IS UNLIKELY THIGH PAIN.  THE PROSTHESIS IS TYPICALLY NOT ASSOCIATED WITH THIGH PAIN.  THE FIT IN THE CANAL IS NOT TIGHT AND IT IS NOT A LARGE (STIFF) SIZE.    I RECOMMENDED THAT HE FOLLOW UP WITH HIS SPINE SURGEON TO ADDRESS TREATING RADIC.  WILL SCHEDULE REPEAT US GUIDED PSOAS INJECTION.

## 2023-08-29 NOTE — PHYSICAL EXAM
[Right] : right hip [2+] : posterior tibialis pulse: 2+ [] : no pain with flexion and external rotation [de-identified] : PAIN WITH RESISTED ABDUCTION.

## 2023-09-01 ENCOUNTER — APPOINTMENT (OUTPATIENT)
Dept: ORTHOPEDIC SURGERY | Facility: CLINIC | Age: 63
End: 2023-09-01
Payer: COMMERCIAL

## 2023-09-01 VITALS — HEIGHT: 69 IN | BODY MASS INDEX: 29.62 KG/M2 | WEIGHT: 200 LBS

## 2023-09-01 PROCEDURE — 99213 OFFICE O/P EST LOW 20 MIN: CPT

## 2023-09-05 NOTE — HISTORY OF PRESENT ILLNESS
[de-identified] : 09/01/2023 - Patient presenting for a follow up neck and back. He reports increased severity of groin pain/stiffness R > L since he was last seen. He also complains of leg discrepancy R longer than L. Complaints of pain/stiffness in the RT quad.  Difficulty lifting his RT leg due to both stiffness, pain, and subjective weakness. He has been following up with Dr. Gutierrez to rule out hip etiologies. Received half dose of cortisone injection in hip, PT is a diabetic.   07/12/2023 - Patient presenting for a follow up neck and back. Chief complaint of axial neck and back stiffness.  No pain, numbness/tingling, or changes in strength in the lower or upper extremities b/l. C/o cracking in the cervical. Intermittent stiff gait. Walking is not limited. Not in physical therapy at this time, remains active at home through ambulation and aquatic exercises. Overall symptoms are stable. Medication regiment with occasional hydrocodone and Meloxicam is helpful.   3/29/23- Patient returns to the office for routine follow up. Last seen approximately three months ago. Continues to have routine low back pain and right hip pains. He is seeing Dr. Gutierrez for the right hip. He reports he?s approximately nine months status post hip replacement. Continues with physical therapy and Pilates exercises. He is using one tablet of hydrocodone in the evening for pain control, and anti-inflammatory as necessary. Denies numbness, tingling or weakness in the distal lower extremities. Complaints are predominantly stiffness in the lower back and the neck in addition to right anterior groin pain  11/30/2022 - 61 y/o male presenting for a follow up of lumbar. Back pain is worse since his last visit. Followed up with outside pm in the city, treated with hydrocodone and Meloxicam to manage his pain. Difficulty with rom. Has continued treatment with physical therapy and home exercise rehab. Back pain is worse with standing, walking, and physical activity. Continues to recover from Rt MIGUEL (05/2022) with Dr. Gutierrez.   08/31/2022 - 61 y/o male presents for an eval of neck & back. C/o low back and neck pain. He reports RT hip replacement in May, which has exacerbated his back pain. C/o Rt foot/ RT knee pain. Reports difficulty with ROM, walking, and lifting. States his groin pain has subsided since hip replacement. Reports leg discrepancy, RT leg is longer. He has been stretching daily, as well as using hot compresses as needed. Reports problems with flexibility, despite involvement in PT / daily exercises and stretches.

## 2023-09-05 NOTE — DISCUSSION/SUMMARY
[de-identified] : Patient will c/t to follow up with Dr. Gutierrez to r/o hip vs lumbar pathology. He will continue indicated injections targeted at Psoas muscles and evaluate response. If there is no positive response from psoas injection, we will order lumbar spine MRI to r/o stenosis L1/2. He will continue home stretching, emphasis on psoas muscle strengthening. Pt will call in 1-2 wks to discuss his response to injection, if no relief I will order lumbar spine MRI.   Prior to appointment and during encounter with patient extensive medical records were reviewed including but not limited to, hospital records, outpatient records, imaging results, and lab data.During this appointment the patient was examined, diagnoses were discussed and explained in a face to face manner. In addition extensive time was spent reviewing aforementioned diagnostic studies. Counseling including abnormal image results, differential diagnoses, treatment options, risk and benefits, lifestyle changes, current condition, and current medications was performed. Patient's comments, questions, and concerns were addressed and patient verbalized understanding. Based on this patient's presentation at our office, which is an orthopedic spine surgeon's office, this patient inherently / intrinsically has a risk, however minute, of developing issues such as Cauda equina syndrome, bowel and bladder changes, or progression of motor or neurological deficits such as paralysis which may be permanent.   JIE RUST Acting as a Scribe for Jie Rashid, attest that this documentation has been prepared under the direction and in the presence of Provider Delfin Miguel MD.

## 2023-09-05 NOTE — PHYSICAL EXAM
[Normal Coordination] : normal coordination [Normal DTR UE/LE] : normal DTR UE/LE  [Normal Sensation] : normal sensation [Normal Mood and Affect] : normal mood and affect [Orientated] : orientated [Able to Communicate] : able to communicate [Normal Skin] : normal skin [No Rash] : no rash [No Ulcers] : no ulcers [No Lesions] : no lesions [No obvious lymphadenopathy in areas examined] : no obvious lymphadenopathy in areas examined [Well Developed] : well developed [Peripheral vascular exam is grossly normal] : peripheral vascular exam is grossly normal [No Respiratory Distress] : no respiratory distress [5___] : right extensor hallicus longus 5[unfilled]/5 [] : clonus not sustained at ankle [de-identified] : ambulatory, slow steady gait.  [FreeTextEntry9] : Restricted ROM

## 2023-09-05 NOTE — DATA REVIEWED
[FreeTextEntry1] : I stop paperwork reviewed Adult reconstruction orthopedic progress notes reviewed

## 2023-09-21 ASSESSMENT — HOOS JR
HOOS JR RAW SCORE: 14
IMPORTED LATERALITY: RIGHT
WALKING ON UNEVEN SURFACE: MODERATE
BENDING TO THE FLOOR TO PICK UP OBJECT: SEVERE
IMPORTED FORM: YES
IMPORTED HOOS JR SCORE: 14.0
LYING IN BED (TURNING OVER, MAINTAINING HIP POSITION): SEVERE
SITTING: MODERATE
GOING UP OR DOWN STAIRS: MODERATE
RISING FROM SITTING: MODERATE

## 2024-01-10 ENCOUNTER — APPOINTMENT (OUTPATIENT)
Dept: ORTHOPEDIC SURGERY | Facility: CLINIC | Age: 64
End: 2024-01-10
Payer: COMMERCIAL

## 2024-01-10 VITALS — HEIGHT: 69 IN | WEIGHT: 200 LBS | BODY MASS INDEX: 29.62 KG/M2

## 2024-01-10 DIAGNOSIS — M48.061 SPINAL STENOSIS, LUMBAR REGION WITHOUT NEUROGENIC CLAUDICATION: ICD-10-CM

## 2024-01-10 DIAGNOSIS — M54.12 RADICULOPATHY, CERVICAL REGION: ICD-10-CM

## 2024-01-10 DIAGNOSIS — M54.16 RADICULOPATHY, LUMBAR REGION: ICD-10-CM

## 2024-01-10 PROCEDURE — ZZZZZ: CPT

## 2024-01-10 RX ORDER — HYDROCODONE BITARTRATE AND ACETAMINOPHEN 5; 325 MG/1; MG/1
5-325 TABLET ORAL EVERY 6 HOURS
Qty: 120 | Refills: 0 | Status: ACTIVE | COMMUNITY
Start: 2024-01-10 | End: 1900-01-01

## 2024-01-11 NOTE — DISCUSSION/SUMMARY
[Medication Risks Reviewed] : Medication risks reviewed [de-identified] : 63 year old male with improvement in his cervical and lumbar spine pain. Patient states that he experiences stiffness due to weather change but overall improved and happy with his results.   Plan: - Patient will c/t to follow up with Dr. Gutierrez to r/o hip vs lumbar pathology. - Patient will continue physical therapy, HEP, and stretching - provided renewal at today's visit.  - Refilled patient's hydrocodone prescription - use as directed. - Follow up on a PRN basis   Prior to appointment and during encounter with patient extensive medical records were reviewed including but not limited to, hospital records, outpatient records, imaging results, and lab data. During this appointment the patient was examined, diagnoses were discussed and explained in a face to face manner. In addition, extensive time was spent reviewing aforementioned diagnostic studies. Counseling including abnormal image results, differential diagnoses, treatment options, risk and benefits, lifestyle changes, current condition, and current medications was performed. Patient's comments, questions, and concerns were addressed, and patient verbalized understanding. Based on this patient's presentation at our office, which is an orthopedic spine surgeon's office, this patient inherently / intrinsically has a risk, however minute, of developing issues such as Cauda equina syndrome, bowel and bladder changes, or progression of motor or neurological deficits such as paralysis which may be permanent.  SHARIF SUÁREZ Acting as a Scribe for Sharif Rashid, attest that this documentation has been prepared under the direction and in the presence of Provider Delfin Miguel MD.

## 2024-01-11 NOTE — HISTORY OF PRESENT ILLNESS
[de-identified] : 1/10/2024 - 63 year old patient presents today for a routine follow-up visit regarding his cervical spine and lumbar spine. He continues to report groin pain and stiffness. However, he notes improvement in pain symptoms but continued stiffness. His cervical spine symptoms have remined the same with only reported stiffness due to the weather. He denies sciatic pain at this time. States that physical therapy provides moderate relief. Patient reports he has been taking half a hydrocodone at night for pain management to sleep comfortably. Patient is a diabetic.   09/01/2023 - Patient presenting for a follow up neck and back. He reports increased severity of groin pain/stiffness R > L since he was last seen. He also complains of leg discrepancy R longer than L. Complaints of pain/stiffness in the RT quad.  Difficulty lifting his RT leg due to both stiffness, pain, and subjective weakness. He has been following up with Dr. Gutierrez to rule out hip etiologies. Received half dose of cortisone injection in hip, PT is a diabetic.   07/12/2023 - Patient presenting for a follow up neck and back. Chief complaint of axial neck and back stiffness.  No pain, numbness/tingling, or changes in strength in the lower or upper extremities b/l. C/o cracking in the cervical. Intermittent stiff gait. Walking is not limited. Not in physical therapy at this time, remains active at home through ambulation and aquatic exercises. Overall symptoms are stable. Medication regiment with occasional hydrocodone and Meloxicam is helpful.   3/29/23- Patient returns to the office for routine follow up. Last seen approximately three months ago. Continues to have routine low back pain and right hip pains. He is seeing Dr. Gutierrez for the right hip. He reports he?s approximately nine months status post hip replacement. Continues with physical therapy and Pilates exercises. He is using one tablet of hydrocodone in the evening for pain control, and anti-inflammatory as necessary. Denies numbness, tingling or weakness in the distal lower extremities. Complaints are predominantly stiffness in the lower back and the neck in addition to right anterior groin pain  11/30/2022 - 61 y/o male presenting for a follow up of lumbar. Back pain is worse since his last visit. Followed up with outside pm in the city, treated with hydrocodone and Meloxicam to manage his pain. Difficulty with rom. Has continued treatment with physical therapy and home exercise rehab. Back pain is worse with standing, walking, and physical activity. Continues to recover from Rt MIGUEL (05/2022) with Dr. Gutierrez.   08/31/2022 - 61 y/o male presents for an eval of neck & back. C/o low back and neck pain. He reports RT hip replacement in May, which has exacerbated his back pain. C/o Rt foot/ RT knee pain. Reports difficulty with ROM, walking, and lifting. States his groin pain has subsided since hip replacement. Reports leg discrepancy, RT leg is longer. He has been stretching daily, as well as using hot compresses as needed. Reports problems with flexibility, despite involvement in PT / daily exercises and stretches.

## 2024-01-11 NOTE — PHYSICAL EXAM
[Normal Coordination] : normal coordination [Normal DTR UE/LE] : normal DTR UE/LE  [Normal Sensation] : normal sensation [Normal Mood and Affect] : normal mood and affect [Orientated] : orientated [Able to Communicate] : able to communicate [Normal Skin] : normal skin [No Rash] : no rash [No Ulcers] : no ulcers [No Lesions] : no lesions [No obvious lymphadenopathy in areas examined] : no obvious lymphadenopathy in areas examined [Well Developed] : well developed [Peripheral vascular exam is grossly normal] : peripheral vascular exam is grossly normal [No Respiratory Distress] : no respiratory distress [5___] : right extensor hallicus longus 5[unfilled]/5 [] : clonus not sustained at ankle [de-identified] : ambulatory, slow steady gait.  [FreeTextEntry9] : Restricted ROM

## 2024-06-14 ENCOUNTER — APPOINTMENT (OUTPATIENT)
Dept: ENDOCRINOLOGY | Facility: CLINIC | Age: 64
End: 2024-06-14
Payer: COMMERCIAL

## 2024-06-14 VITALS
WEIGHT: 205 LBS | DIASTOLIC BLOOD PRESSURE: 80 MMHG | BODY MASS INDEX: 30.36 KG/M2 | HEIGHT: 69 IN | OXYGEN SATURATION: 97 % | SYSTOLIC BLOOD PRESSURE: 116 MMHG | HEART RATE: 72 BPM

## 2024-06-14 DIAGNOSIS — E78.5 HYPERLIPIDEMIA, UNSPECIFIED: ICD-10-CM

## 2024-06-14 DIAGNOSIS — E11.9 TYPE 2 DIABETES MELLITUS W/OUT COMPLICATIONS: ICD-10-CM

## 2024-06-14 LAB — GLUCOSE BLDC GLUCOMTR-MCNC: 169

## 2024-06-14 PROCEDURE — 99204 OFFICE O/P NEW MOD 45 MIN: CPT | Mod: 25

## 2024-06-14 PROCEDURE — 82962 GLUCOSE BLOOD TEST: CPT

## 2024-06-14 RX ORDER — HYDROCODONE BITARTRATE AND ACETAMINOPHEN 5; 325 MG/1; MG/1
5-325 TABLET ORAL
Refills: 0 | Status: DISCONTINUED | COMMUNITY
End: 2024-06-14

## 2024-06-14 RX ORDER — HYDROCODONE BITARTRATE AND ACETAMINOPHEN 5; 325 MG/1; MG/1
5-325 TABLET ORAL
Qty: 120 | Refills: 0 | Status: DISCONTINUED | COMMUNITY
Start: 2023-07-12 | End: 2024-06-14

## 2024-06-14 RX ORDER — BLOOD SUGAR DIAGNOSTIC
STRIP MISCELLANEOUS 3 TIMES DAILY
Qty: 3 | Refills: 1 | Status: ACTIVE | COMMUNITY
Start: 2024-06-14 | End: 1900-01-01

## 2024-06-14 RX ORDER — METFORMIN HYDROCHLORIDE 625 MG/1
TABLET ORAL
Refills: 0 | Status: DISCONTINUED | COMMUNITY
End: 2024-06-14

## 2024-06-14 RX ORDER — EMPAGLIFLOZIN AND METFORMIN HYDROCHLORIDE 12.5; 1 MG/1; MG/1
TABLET ORAL
Refills: 0 | Status: ACTIVE | COMMUNITY

## 2024-06-14 RX ORDER — MELOXICAM 15 MG/1
15 TABLET ORAL DAILY
Qty: 30 | Refills: 0 | Status: DISCONTINUED | COMMUNITY
Start: 2023-03-29 | End: 2024-06-14

## 2024-06-14 RX ORDER — HYDROCODONE BITARTRATE AND ACETAMINOPHEN 5; 325 MG/1; MG/1
5-325 TABLET ORAL
Qty: 30 | Refills: 0 | Status: DISCONTINUED | COMMUNITY
Start: 2023-03-29 | End: 2024-06-14

## 2024-06-14 NOTE — PHYSICAL EXAM
[Alert] : alert [No Acute Distress] : no acute distress [Well Developed] : well developed [Normal Sclera/Conjunctiva] : normal sclera/conjunctiva [EOMI] : extra ocular movement intact [No Proptosis] : no proptosis [No Lid Lag] : no lid lag [Supple] : the neck was supple [Thyroid Not Enlarged] : the thyroid was not enlarged [No Thyroid Nodules] : no palpable thyroid nodules [No Respiratory Distress] : no respiratory distress [No Accessory Muscle Use] : no accessory muscle use [Normal Rate and Effort] : normal respiratory rate and effort [Normal Gait] : normal gait [No Involuntary Movements] : no involuntary movements were seen [No Tremors] : no tremors [Oriented x3] : oriented to person, place, and time [Normal Insight/Judgement] : insight and judgment were intact

## 2024-06-20 LAB
CREAT SPEC-SCNC: 54 MG/DL
MICROALBUMIN 24H UR DL<=1MG/L-MCNC: <1.2 MG/DL
MICROALBUMIN/CREAT 24H UR-RTO: NORMAL MG/G

## 2024-07-02 PROBLEM — E78.5 HYPERLIPIDEMIA, UNSPECIFIED HYPERLIPIDEMIA TYPE: Status: ACTIVE | Noted: 2024-07-02

## 2024-07-02 NOTE — ASSESSMENT
[Diabetes Foot Care] : diabetes foot care [Long Term Vascular Complications] : long term vascular complications of diabetes [Carbohydrate Consistent Diet] : carbohydrate consistent diet [Importance of Diet and Exercise] : importance of diet and exercise to improve glycemic control, achieve weight loss and improve cardiovascular health [Hypoglycemia Management] : hypoglycemia management [Self Monitoring of Blood Glucose] : self monitoring of blood glucose [Retinopathy Screening] : Patient was referred to ophthalmology for retinopathy screening [Diabetic Medications] : Risks and benefits of diabetic medications were discussed [FreeTextEntry1] : Type 2 DM  Recent HgbA1c is 7.4%, almost at goal Can continue Synjardy, will need to clarify dose Reviewed labs from PCP office from 5/23/24, HgbA1c 7.4%, renal function is stable noted crt 0.97, [id number] Declined injectables including GLP-1 agonist today Last ophthalmology visit in April 2024, no ho retinopathy, early cataracts Reviewed lipid panel, noted LDL 133mg/dl, recently started Rosuvastatin for hyperlipidemia Will check urine micro/alb crt  Answered all questions today; patient verbalized understanding of the above RTO in 3 months

## 2024-07-02 NOTE — HISTORY OF PRESENT ILLNESS
[FreeTextEntry1] : DOMINGO PORRAS is a 63 yo male with past medical history of type 2 DM who presents for diabetes management   Patient was diagnosed with diabetes 6 years ago. Patient was initially on oral antihyperglycemics, started insulin on. He notes he a work accident that led to metal plates,screws in neck Currently taking Synjardy  Typical diet: breakfast:greek yogurt lunch:salad  dinner:veggies, protein Desserts: rare No soda or juice   Patient checks fingerstick glucose 1x/day, notes AM fasting ranges 130-155.  He forgot meter today  Patient denies recent or previous noted hospitalizations for DKA/hyperglycemia or hypoglycemia as per patient. Last visit with podiatrist for annual diabetic foot screening was in  Last visit with ophthalmologist for annual diabetic eye screening.  He denies recent episodes of hypoglycemia or hypoglycemic symptoms, denies numbness or tingling sensations in fingers/toes, tremors, sweating, palpitations, nausea or vomiting. He denies abdominal pain, fever, recent illness or sickness. He also denies history of personal or family history of thyroid cancer, h/o recurrent genitourinary infections.    Family hx:Mom- DM, father,. PCP is Dr Des[facility] Dr Dhaar Rebollar

## 2024-09-12 RX ORDER — ROSUVASTATIN CALCIUM 10 MG/1
10 TABLET, FILM COATED ORAL
Qty: 90 | Refills: 1 | Status: ACTIVE | COMMUNITY
Start: 2024-09-12 | End: 1900-01-01

## 2024-10-17 ENCOUNTER — APPOINTMENT (OUTPATIENT)
Dept: ENDOCRINOLOGY | Facility: CLINIC | Age: 64
End: 2024-10-17

## 2024-10-18 ENCOUNTER — APPOINTMENT (OUTPATIENT)
Dept: ORTHOPEDIC SURGERY | Facility: CLINIC | Age: 64
End: 2024-10-18
Payer: COMMERCIAL

## 2024-10-18 VITALS — WEIGHT: 205 LBS | BODY MASS INDEX: 30.36 KG/M2 | HEIGHT: 69 IN

## 2024-10-18 DIAGNOSIS — M48.061 SPINAL STENOSIS, LUMBAR REGION WITHOUT NEUROGENIC CLAUDICATION: ICD-10-CM

## 2024-10-18 DIAGNOSIS — M54.12 RADICULOPATHY, CERVICAL REGION: ICD-10-CM

## 2024-10-18 PROCEDURE — 99213 OFFICE O/P EST LOW 20 MIN: CPT

## 2024-10-18 RX ORDER — HYDROCODONE BITARTRATE AND ACETAMINOPHEN 5; 325 MG/1; MG/1
5-325 TABLET ORAL EVERY 6 HOURS
Qty: 120 | Refills: 0 | Status: ACTIVE | COMMUNITY
Start: 2024-10-18 | End: 1900-01-01

## 2025-01-28 ENCOUNTER — APPOINTMENT (OUTPATIENT)
Dept: ORTHOPEDIC SURGERY | Facility: CLINIC | Age: 65
End: 2025-01-28
Payer: COMMERCIAL

## 2025-01-28 DIAGNOSIS — Z96.641 PRESENCE OF RIGHT ARTIFICIAL HIP JOINT: ICD-10-CM

## 2025-01-28 DIAGNOSIS — M76.11 PSOAS TENDINITIS, RIGHT HIP: ICD-10-CM

## 2025-01-28 PROCEDURE — 73502 X-RAY EXAM HIP UNI 2-3 VIEWS: CPT

## 2025-01-28 PROCEDURE — G2211 COMPLEX E/M VISIT ADD ON: CPT | Mod: NC

## 2025-01-28 PROCEDURE — 99214 OFFICE O/P EST MOD 30 MIN: CPT
